# Patient Record
Sex: MALE | Race: AMERICAN INDIAN OR ALASKA NATIVE
[De-identification: names, ages, dates, MRNs, and addresses within clinical notes are randomized per-mention and may not be internally consistent; named-entity substitution may affect disease eponyms.]

---

## 2021-12-12 ENCOUNTER — HOSPITAL ENCOUNTER (EMERGENCY)
Dept: HOSPITAL 43 - DL.ED | Age: 36
Discharge: HOME | End: 2021-12-12
Payer: COMMERCIAL

## 2021-12-12 DIAGNOSIS — R11.14: Primary | ICD-10-CM

## 2021-12-12 LAB
ANION GAP SERPL CALC-SCNC: 16.4 MEQ/L (ref 7–13)
CHLORIDE SERPL-SCNC: 103 MMOL/L (ref 98–107)
SODIUM SERPL-SCNC: 141 MMOL/L (ref 136–145)

## 2021-12-12 PROCEDURE — C9113 INJ PANTOPRAZOLE SODIUM, VIA: HCPCS

## 2021-12-12 PROCEDURE — 82150 ASSAY OF AMYLASE: CPT

## 2021-12-12 PROCEDURE — 99284 EMERGENCY DEPT VISIT MOD MDM: CPT

## 2021-12-12 PROCEDURE — 83605 ASSAY OF LACTIC ACID: CPT

## 2021-12-12 PROCEDURE — 85025 COMPLETE CBC W/AUTO DIFF WBC: CPT

## 2021-12-12 PROCEDURE — 96365 THER/PROPH/DIAG IV INF INIT: CPT

## 2021-12-12 PROCEDURE — 83690 ASSAY OF LIPASE: CPT

## 2021-12-12 PROCEDURE — 36415 COLL VENOUS BLD VENIPUNCTURE: CPT

## 2021-12-12 PROCEDURE — 80053 COMPREHEN METABOLIC PANEL: CPT

## 2021-12-12 PROCEDURE — 96375 TX/PRO/DX INJ NEW DRUG ADDON: CPT

## 2021-12-12 NOTE — EDM.PDOC
ED HPI GENERAL MEDICAL PROBLEM





- General


Stated Complaint: STOMACH PAIN,VOMITING WITH BLOOD


Time Seen by Provider: 12/12/21 22:00


Source of Information: Reports: Patient, RN


History Limitations: Reports: No Limitations





- History of Present Illness


INITIAL COMMENTS - FREE TEXT/NARRATIVE: 





ED with c/o vomiting blood. Emesis greater than 10 today  Hx ulcers in past, 

Improved since eliminating soda and coffee. Not on any medications. Ate chicken 

Burrito for supper last jeniffer  and woke in middle of night vomited went back to 

sleep  woke fine ate cheeseburger for lunch, multiple emesis after to dry heaves

and bile , worried as noted wayne of blood in last emesis. Currently no nausea 

or pain.   Discomfort when present earlier today and in past limited to 

epigastric mostly burning type sensation. No radiation.  No fever. No c/o 

urinary sx. No COVID type symptoms or known exposure. 





  ** Anterior Abdomen


Pain Score (Numeric/FACES): 2





- Related Data


                                    Allergies











Allergy/AdvReac Type Severity Reaction Status Date / Time


 


No Known Allergies Allergy   Verified 12/12/21 21:52











Home Meds: 


                                    Home Meds





. [No Known Home Meds]  12/12/21 [History]











ED ROS GENERAL





- Review of Systems


Review Of Systems: Comprehensive ROS is negative, except as noted in HPI.





ED EXAM, GI/ABD





- Physical Exam


Exam: See Below


Exam Limited By: No Limitations


General Appearance: Alert, No Apparent Distress


Eyes: Bilateral: EOMI


Ears: Normal External Exam


Nose: Normal Inspection


Throat/Mouth: Normal Inspection


Head: Atraumatic, Normocephalic


Neck: Normal Inspection


Respiratory/Chest: No Respiratory Distress, Lungs Clear, Normal Breath Sounds


Cardiovascular: Normal Peripheral Pulses, Regular Rate, Rhythm


GI/Abdominal Exam: Normal Bowel Sounds, Soft, Non-Tender, No Distention.  No: 

Guarding, Rigid, Rebound, Tender


Extremities: Normal Inspection, Normal Range of Motion


Neurological: Alert, Oriented, Normal Cognition


Psychiatric: Normal Affect


Skin Exam: Warm, Dry, Intact, Normal Color





Course





- Vital Signs


Last Recorded V/S: 


                                Last Vital Signs











Temp  99.5 F   12/12/21 23:10


 


Pulse  72   12/12/21 23:10


 


Resp  20   12/12/21 23:10


 


BP  134/71   12/12/21 23:10


 


Pulse Ox  97   12/12/21 23:10














- Orders/Labs/Meds


Labs: 


                                Laboratory Tests











  12/12/21 12/12/21 12/12/21 Range/Units





  22:10 22:10 22:10 


 


WBC  20.4 H    (5.0-10.0)  10^3/uL


 


RBC  4.95    (4.6-6.2)  10^6/uL


 


Hgb  14.6    (14.0-18.0)  g/dL


 


Hct  43.2    (40.0-54.0)  %


 


MCV  87.3    ()  fL


 


MCH  29.5    (27.0-34.0)  pg


 


MCHC  33.8    (33.0-35.0)  g/dL


 


Plt Count  480 H    (150-450)  10^3/uL


 


Neut % (Auto)  89.0 H    (42.2-75.2)  %


 


Lymph % (Auto)  7.5 L    (20.5-50.1)  %


 


Mono % (Auto)  3.3    (2-8)  %


 


Eos % (Auto)  0.0 L    (1.0-3.0)  %


 


Baso % (Auto)  0.2    (0.0-1.0)  %


 


Sodium   141   (136-145)  mmol/L


 


Potassium   3.4 L   (3.5-5.1)  mmol/L


 


Chloride   103   ()  mmol/L


 


Carbon Dioxide   25   (21-32)  mmol/L


 


Anion Gap   16.4 H   (7-13)  mEq/L


 


BUN   12   (7-18)  mg/dL


 


Creatinine   0.97   (0.70-1.30)  mg/dL


 


Est Cr Clr Drug Dosing   112.13   mL/min


 


Estimated GFR (MDRD)   > 60   


 


BUN/Creatinine Ratio   12.4   (No establ ref range)  


 


Glucose   126 H   (70-99)  mg/dL


 


Lactic Acid    0.9  (0.4-2.0)  mmol/L


 


Calcium   9.0   (8.5-10.1)  mg/dL


 


Total Bilirubin   0.5   (0.2-1.0)  mg/dL


 


AST   15   (15-37)  U/L


 


ALT   19   (16-63)  U/L


 


Alkaline Phosphatase   88   ()  U/L


 


Total Protein   7.9   (6.4-8.2)  g/dL


 


Albumin   4.1   (3.4-5.0)  g/dL


 


Globulin   3.8   


 


Albumin/Globulin Ratio   1.1   


 


Amylase   118 H   ()  U/L


 


Lipase   106   ()  U/L











Meds: 


Medications














Discontinued Medications














Generic Name Dose Route Start Last Admin





  Trade Name Uyen  PRN Reason Stop Dose Admin


 


Sodium Chloride  1,000 mls @ 999 mls/hr  12/12/21 22:00  12/12/21 22:14





  Normal Saline  IV  12/12/21 23:00  999 mls/hr





  .BOLUS ONE   Administration


 


Pantoprazole Sodium 80 mg/  100 mls @ 200 mls/hr  12/12/21 22:00  12/12/21 22:25





  Sodium Chloride  IV  12/12/21 22:29  200 mls/hr





  .BOLUS ONE   Administration


 


Ondansetron HCl  4 mg  12/12/21 22:00  12/12/21 22:14





  Ondansetron 4 Mg/2 Ml Sdv  IVPUSH  12/12/21 22:01  4 mg





  ONETIME ONE   Administration














Departure





- Departure


Time of Disposition: 23:42


Disposition: Home, Self-Care 01


Condition: Good


Clinical Impression: 


Nausea & vomiting


Qualifiers:


 Vomiting type: bilious vomiting Qualified Code(s): R11.14 - Bilious vomiting








- Discharge Information


*PRESCRIPTION DRUG MONITORING PROGRAM REVIEWED*: No


*COPY OF PRESCRIPTION DRUG MONITORING REPORT IN PATIENT FELECIA: No


Instructions:  Nausea and Vomiting, Adult


Additional Instructions: 


zofran 4mg ODT one every 4 hours as needed for nausea


Omeprazole 20mg daily for one week then 20mg daily, take on empty stomach


clear liquid small amounts more frequently , advance as tolerated - bland diet


limit fatty/ gerasy foods


clinic follow up , consider gall bladder ultrasound








Sepsis Event Note (ED)





- Focused Exam


Vital Signs: 


                                   Vital Signs











  Temp Pulse Resp BP BP Pulse Ox


 


 12/12/21 23:10  99.5 F  72  20   134/71  97


 


 12/12/21 22:30   74     96


 


 12/12/21 22:00   78  18  118/69   97


 


 12/12/21 21:51  98.9 F  90  18  131/77   97